# Patient Record
(demographics unavailable — no encounter records)

---

## 2025-03-12 NOTE — DISCUSSION/SUMMARY
[EKG obtained to assist in diagnosis and management of assessed problem(s)] : EKG obtained to assist in diagnosis and management of assessed problem(s) [FreeTextEntry1] : Pt is a 45 y/o F PMH MVP (no regurgitation as per pt), HLD She is concerned about family hx - mother CAD/PCI 75/HTN, father Lewy Body disease, sister HTN, multiple aunts with CAD/HTN.  She will occasionally get chest discomfort.    check CCTA to eval for CAD Will check transthoracic echocardiogram to evaluate left ventricular function and assess for any structural abnormalities   HLD: will try to get copy of most recent labs Advised lifestyle modifications   The described plan was discussed with the pt.  All questions and concerns were addressed to the best of my knowledge.

## 2025-03-12 NOTE — HISTORY OF PRESENT ILLNESS
[FreeTextEntry1] : Pt is a 45 y/o F who presents today for initial evaluation. Pt has PMH MVP (no regurgitation as per pt), HLD She is concerned about family hx - mother CAD/PCI 75/HTN, father Lewy Body disease, sister HTN, multiple aunts with CAD/HTN.  She will occasionally get chest discomfort.   She is scheduled for D&C 03/25/2025  contrast allergy   additional PMH: anxiety Previous surgeries: D&C - no problems with anesthesia Family hx: no SCD, mother CAD/PCI 75/HTN, father Lewy Body disease, sister HTN, aunts CAD/HTN Smoking status: never no ETOH no drug use Current exercise: walking and weight training 90 min Daily water intake: 2-3 liters Daily caffeine intake: 3 cups coffee/espressos OTC medications:  1 child - no problem with pregnancy

## 2025-03-23 NOTE — HISTORY OF PRESENT ILLNESS
[N] : Patient does not use contraception [Y] : Positive pregnancy history [Menarche Age: ____] : age at menarche was [unfilled] [No] : Patient does not have concerns regarding sex [Currently Active] : currently active [Men] : men [TextBox_19] : BR2 [Mammogramdate] : 07/20/22 [BreastSonogramDate] : 07/20/22 [TextBox_25] : BR2 [PapSmeardate] : 01/24/25 [TextBox_31] : NEG [HPVDate] : 01/24/25 [TextBox_78] : NEG [LMPDate] : 02/25/25 [PGHxTotal] : 3 [[facility]] : 1 [[facility]erm] : 1 [PGHxABSpont] : 2 [FreeTextEntry1] : 02/25/25

## 2025-03-23 NOTE — END OF VISIT
[FreeTextEntry3] : I, Prashant Downey solely acted as scribe for Dr. Ana M Coronel on 03/20/2025  All medical entries made by the Scribe were at my, Dr. Rowland, direction and personally dictated by me on 03/20/2025 . I have reviewed the chart and agree that the record accurately reflects my personal performance of the history, physical exam, assessment and plan. I have also personally directed, reviewed, and agreed with the chart.

## 2025-03-23 NOTE — PLAN
[FreeTextEntry1] : Hysteroscopy findings reviewed with pt. Polyp vs fibroid.  Will call with EMB results. If biopsy says portion of polyp and she is bleeding abnormally again, will schedule D & C with polypectomy. If she is not bleeding abnormally, will leave it alone.   We discussed in detail her perimenopausal symptoms and how they affect her daily quality of life. We reviewed the risks of untreated vasomotor symptoms and diminished sleep as a cardiac risk factor. Her medical history was reviewed in detail. There are no contraindications to menopausal hormone therapy. We discussed systemic menopause hormone therapy, as well as nonhormonal treatment options for her menopausal symptoms.   She is aware that systemic menopausal hormone therapy is FDA approved and is recommended for women within 10 years of menopause and less than 60 years old with vasomotor symptoms and other menopausal symptoms.   Given the fact that she has a uterus, we discussed the need for both estrogen and progesterone therapy. By taking progesterone as well, this will reduce her risk of endometrial hyperplasia and endometrial cancer. We discussed the rare risks of menopausal hormone therapy including a rare increase in breast cancer (approx 1 case per 1000), rare increase in cardiovascular disease (approx 2.5 cases per 1000), rare increase in stroke (approx 2.5 cases per 1000), and a rare increase in pulmonary embolism (approx 3 cases per 1000). I also explained that there is actually a small reduction of all cause mortality overall (approx 5 fewer cases per 1000).   Side effects may be encountered for the first 3-6 months of systemic menopausal hormone therapy initiation. She is aware of the potential side effects including breast tenderness, mood changes, bloating, and abnormal vaginal bleeding. She is aware that if she has any abnormal bleeding that persists after 3-6 months, she must return for a uterine evaluation.   We discussed the different formulations of hormones and routes of estrogen delivery via transdermal routes (patches, gels, sprays), transvaginal formulations via a Femring and oral routes. We discussed the benefits and risks of each different routes individually. We also discussed the different routes of progesterone delivery including transdermal via patch, oral or intrauterine routes (Mirena or Kyleena IUD).   After an extensive discussion, the patient has concluded that the benefits of systemic menopausal hormone therapy outweigh the risks and through a shared decision-making process, the patient will consider hormones. Will send rx anyway. My recommendation is to start with transdermal or transvaginal estrogen routes as there may likely be less risks than oral therapy. She is aware it can take 3 months to have relief of symptoms. She was instructed to follow up in the office in 3-4 months. Prescription sent for transdermal estradiol 0.05 MG & 100 MG of oral progesterone.    Questions answered.  F/u annually or as needed.

## 2025-03-23 NOTE — HISTORY OF PRESENT ILLNESS
[N] : Patient does not use contraception [Y] : Positive pregnancy history [Menarche Age: ____] : age at menarche was [unfilled] [No] : Patient does not have concerns regarding sex [Currently Active] : currently active [Men] : men [Mammogramdate] : 07/20/22 [TextBox_19] : BR2 [BreastSonogramDate] : 07/20/22 [TextBox_25] : BR2 [PapSmeardate] : 01/24/25 [HPVDate] : 01/24/25 [TextBox_31] : NEG [TextBox_78] : NEG [LMPDate] : 02/25/25 [PGHxTotal] : 3 [[facility]xFulerm] : 1 [[facility]iving] : 1 [PGHxABSpont] : 2 [FreeTextEntry1] : 02/25/25